# Patient Record
Sex: FEMALE | HISPANIC OR LATINO | URBAN - METROPOLITAN AREA
[De-identification: names, ages, dates, MRNs, and addresses within clinical notes are randomized per-mention and may not be internally consistent; named-entity substitution may affect disease eponyms.]

---

## 2024-04-16 ENCOUNTER — OV NP (OUTPATIENT)
Dept: URBAN - METROPOLITAN AREA CLINIC 98 | Facility: CLINIC | Age: 64
End: 2024-04-16
Payer: SELF-PAY

## 2024-04-16 ENCOUNTER — LAB (OUTPATIENT)
Dept: URBAN - METROPOLITAN AREA CLINIC 98 | Facility: CLINIC | Age: 64
End: 2024-04-16

## 2024-04-16 VITALS
HEART RATE: 90 BPM | HEIGHT: 62 IN | DIASTOLIC BLOOD PRESSURE: 80 MMHG | WEIGHT: 172 LBS | TEMPERATURE: 97.4 F | BODY MASS INDEX: 31.65 KG/M2 | SYSTOLIC BLOOD PRESSURE: 135 MMHG

## 2024-04-16 DIAGNOSIS — Z12.11 COLON CANCER SCREENING: ICD-10-CM

## 2024-04-16 PROBLEM — 722596001: Status: ACTIVE | Noted: 2024-04-16

## 2024-04-16 PROBLEM — 162864005: Status: ACTIVE | Noted: 2024-04-16

## 2024-04-16 PROCEDURE — 99243 OFF/OP CNSLTJ NEW/EST LOW 30: CPT | Performed by: INTERNAL MEDICINE

## 2024-04-16 PROCEDURE — 99203 OFFICE O/P NEW LOW 30 MIN: CPT | Performed by: INTERNAL MEDICINE

## 2024-04-16 RX ORDER — AMLODIPINE BESYLATE 10 MG/1
TAKE 1 TABLET (10 MG) BY ORAL ROUTE ONCE DAILY TABLET ORAL 1
Qty: 0 | Refills: 0 | Status: ACTIVE | COMMUNITY
Start: 1900-01-01

## 2024-04-16 RX ORDER — SODIUM, POTASSIUM,MAG SULFATES 17.5-3.13G
354ML SOLUTION, RECONSTITUTED, ORAL ORAL
Qty: 345 MILLILITER | Refills: 0 | OUTPATIENT
Start: 2024-04-16 | End: 2024-04-17

## 2024-04-16 NOTE — HPI-TODAY'S VISIT:
Patient referred by Edson Pedersen MD for CRC screening. Copy of this consult OV sent to Dr. Pedersen.  62 yo pt who's due for CRC screening. No prior colonoscopy. Has been asx from [facility] point of view: no abdominal pain, constipation, diarrhea,melenic stools, hematochezia, anorexia nor weight loss. No FHx CC / pp / IBD / GI malignancies. NO cardiorespiratory nor constitutional sxs.  Normal CPE 1/24. Two weeks ago seen n the ER @ [facility] for lower abdominal pain: Dx'd w intestinal infection, Rx'd w Cipro / Flagyl w resolution of sxs. Labs w leukocytosis and A + P CT w diffuse colitis wo obstruction. Today wo major complaints.  No other complaints after extensive ROS.

## 2024-05-13 ENCOUNTER — CLAIMS CREATED FROM THE CLAIM WINDOW (OUTPATIENT)
Dept: URBAN - METROPOLITAN AREA SURGERY CENTER 18 | Facility: SURGERY CENTER | Age: 64
End: 2024-05-13

## 2024-05-13 ENCOUNTER — CLAIMS CREATED FROM THE CLAIM WINDOW (OUTPATIENT)
Dept: URBAN - METROPOLITAN AREA CLINIC 4 | Facility: CLINIC | Age: 64
End: 2024-05-13
Payer: SELF-PAY

## 2024-05-13 ENCOUNTER — OUT OF OFFICE VISIT (OUTPATIENT)
Dept: URBAN - METROPOLITAN AREA SURGERY CENTER 18 | Facility: SURGERY CENTER | Age: 64
End: 2024-05-13
Payer: SELF-PAY

## 2024-05-13 DIAGNOSIS — C78.5 SECONDARY MALIGNANT NEOPLASM OF LARGE INTESTINE AND RECTUM: ICD-10-CM

## 2024-05-13 DIAGNOSIS — Z12.11 COLON CANCER SCREENING (HIGH RISK): ICD-10-CM

## 2024-05-13 DIAGNOSIS — C18.9 MALIGNANT NEOPLASM OF COLON, UNSPECIFIED: ICD-10-CM

## 2024-05-13 DIAGNOSIS — K57.30 DIVERTICULA OF COLON: ICD-10-CM

## 2024-05-13 PROCEDURE — 00812 ANES LWR INTST SCR COLSC: CPT | Performed by: NURSE ANESTHETIST, CERTIFIED REGISTERED

## 2024-05-13 PROCEDURE — 88342 IMHCHEM/IMCYTCHM 1ST ANTB: CPT | Performed by: PATHOLOGY

## 2024-05-13 PROCEDURE — 88341 IMHCHEM/IMCYTCHM EA ADD ANTB: CPT | Performed by: PATHOLOGY

## 2024-05-13 PROCEDURE — 88305 TISSUE EXAM BY PATHOLOGIST: CPT | Performed by: PATHOLOGY

## 2024-05-13 RX ORDER — AMLODIPINE BESYLATE 10 MG/1
TAKE 1 TABLET (10 MG) BY ORAL ROUTE ONCE DAILY TABLET ORAL 1
Qty: 0 | Refills: 0 | Status: ACTIVE | COMMUNITY
Start: 1900-01-01

## 2024-05-22 ENCOUNTER — OFFICE VISIT (OUTPATIENT)
Dept: URBAN - METROPOLITAN AREA CLINIC 98 | Facility: CLINIC | Age: 64
End: 2024-05-22
Payer: SELF-PAY

## 2024-05-22 ENCOUNTER — DASHBOARD ENCOUNTERS (OUTPATIENT)
Age: 64
End: 2024-05-22

## 2024-05-22 ENCOUNTER — LAB OUTSIDE AN ENCOUNTER (OUTPATIENT)
Dept: URBAN - METROPOLITAN AREA CLINIC 98 | Facility: CLINIC | Age: 64
End: 2024-05-22

## 2024-05-22 VITALS
SYSTOLIC BLOOD PRESSURE: 126 MMHG | BODY MASS INDEX: 30.25 KG/M2 | HEIGHT: 62 IN | TEMPERATURE: 98.2 F | DIASTOLIC BLOOD PRESSURE: 84 MMHG | WEIGHT: 164.4 LBS | HEART RATE: 83 BPM

## 2024-05-22 DIAGNOSIS — N83.8 ENLARGED OVARY: ICD-10-CM

## 2024-05-22 DIAGNOSIS — C18.7 MALIGNANT NEOPLASM OF SIGMOID COLON: ICD-10-CM

## 2024-05-22 PROCEDURE — 99214 OFFICE O/P EST MOD 30 MIN: CPT | Performed by: INTERNAL MEDICINE

## 2024-05-22 RX ORDER — AMLODIPINE BESYLATE 10 MG/1
TAKE 1 TABLET (10 MG) BY ORAL ROUTE ONCE DAILY TABLET ORAL 1
Qty: 0 | Refills: 0 | Status: ACTIVE | COMMUNITY
Start: 1900-01-01

## 2024-05-23 LAB
A/G RATIO: 1.1
ALBUMIN: 4.3
ALKALINE PHOSPHATASE: 126
ALT (SGPT): 25
AST (SGOT): 20
BILIRUBIN, TOTAL: 0.4
BUN/CREATININE RATIO: 10
BUN: 8
CALCIUM: 9.8
CARBON DIOXIDE, TOTAL: 22
CEA: 2.2
CHLORIDE: 101
CREATININE: 0.78
EGFR: 86
GLOBULIN, TOTAL: 3.8
GLUCOSE: 114
HEMATOCRIT: 44.1
HEMOGLOBIN: 14
MCH: 27.5
MCHC: 31.7
MCV: 87
NRBC: (no result)
PLATELETS: 307
POTASSIUM: 3.9
PROTEIN, TOTAL: 8.1
RBC: 5.09
RDW: 14.3
SODIUM: 140
WBC: 8.3

## 2024-05-28 PROBLEM — 363410008: Status: ACTIVE | Noted: 2024-05-22

## 2025-08-06 ENCOUNTER — CLAIMS CREATED FROM THE CLAIM WINDOW (OUTPATIENT)
Dept: URBAN - METROPOLITAN AREA MEDICAL CENTER 28 | Facility: MEDICAL CENTER | Age: 65
End: 2025-08-06
Payer: SELF-PAY

## 2025-08-06 DIAGNOSIS — K92.1 ACUTE MELENA: ICD-10-CM

## 2025-08-06 DIAGNOSIS — Z93.3 COLOSTOMY STATUS: ICD-10-CM

## 2025-08-06 PROCEDURE — 99255 IP/OBS CONSLTJ NEW/EST HI 80: CPT | Performed by: INTERNAL MEDICINE

## 2025-08-07 ENCOUNTER — CLAIMS CREATED FROM THE CLAIM WINDOW (OUTPATIENT)
Dept: URBAN - METROPOLITAN AREA MEDICAL CENTER 28 | Facility: MEDICAL CENTER | Age: 65
End: 2025-08-07
Payer: SELF-PAY

## 2025-08-07 DIAGNOSIS — Z93.3 COLOSTOMY STATUS: ICD-10-CM

## 2025-08-07 DIAGNOSIS — K92.1 ACUTE MELENA: ICD-10-CM

## 2025-08-07 PROCEDURE — 99233 SBSQ HOSP IP/OBS HIGH 50: CPT | Performed by: INTERNAL MEDICINE

## 2025-08-08 ENCOUNTER — CLAIMS CREATED FROM THE CLAIM WINDOW (OUTPATIENT)
Dept: URBAN - METROPOLITAN AREA MEDICAL CENTER 28 | Facility: MEDICAL CENTER | Age: 65
End: 2025-08-08
Payer: SELF-PAY

## 2025-08-08 DIAGNOSIS — K92.1 ACUTE MELENA: ICD-10-CM

## 2025-08-08 DIAGNOSIS — Z93.3 COLOSTOMY STATUS: ICD-10-CM

## 2025-08-08 PROCEDURE — 99233 SBSQ HOSP IP/OBS HIGH 50: CPT | Performed by: INTERNAL MEDICINE

## 2025-08-14 ENCOUNTER — CLAIMS CREATED FROM THE CLAIM WINDOW (OUTPATIENT)
Dept: URBAN - METROPOLITAN AREA MEDICAL CENTER 28 | Facility: MEDICAL CENTER | Age: 65
End: 2025-08-14
Payer: SELF-PAY

## 2025-08-14 DIAGNOSIS — K56.690 OTHER PARTIAL INTESTINAL OBSTRUCTION: ICD-10-CM

## 2025-08-14 DIAGNOSIS — R93.3 ABN FINDINGS-GI TRACT: ICD-10-CM

## 2025-08-14 DIAGNOSIS — Z93.3 COLOSTOMY STATUS: ICD-10-CM

## 2025-08-14 PROCEDURE — 99232 SBSQ HOSP IP/OBS MODERATE 35: CPT | Performed by: INTERNAL MEDICINE
